# Patient Record
Sex: FEMALE | Race: WHITE | Employment: UNEMPLOYED | URBAN - METROPOLITAN AREA
[De-identification: names, ages, dates, MRNs, and addresses within clinical notes are randomized per-mention and may not be internally consistent; named-entity substitution may affect disease eponyms.]

---

## 2017-01-16 ENCOUNTER — OFFICE VISIT (OUTPATIENT)
Dept: ORTHOPEDIC SURGERY | Age: 57
End: 2017-01-16

## 2017-01-16 VITALS
WEIGHT: 160 LBS | HEART RATE: 60 BPM | SYSTOLIC BLOOD PRESSURE: 148 MMHG | BODY MASS INDEX: 25.11 KG/M2 | DIASTOLIC BLOOD PRESSURE: 78 MMHG | HEIGHT: 67 IN

## 2017-01-16 DIAGNOSIS — M54.16 RADICULOPATHY, LUMBAR REGION: ICD-10-CM

## 2017-01-16 DIAGNOSIS — M54.5 LOW BACK PAIN, UNSPECIFIED BACK PAIN LATERALITY, UNSPECIFIED CHRONICITY, WITH SCIATICA PRESENCE UNSPECIFIED: Primary | ICD-10-CM

## 2017-01-16 NOTE — PROGRESS NOTES
[facility]  [address]  [address], [place]   Phone: [phone]  Fax: [phone]        PROGRESS NOTE      HISTORY OF PRESENT ILLNESS:  The patient is a 60 y.o. female and was seen today for follow up of low back pain extending into the BLE to the feet with paraesthesias of BLE since early October 2016. She states her legs will often give out. She is a poor historian. I last saw patient in 8/2013 with widespread pain complaints and additional low back pain extending into the LLE. Her pain at that time was felt to be in a nonanatomic distribution. She was noted to have minimal degenerative changes on MRI and a negative lower extremity EMG. At that time, she was referred to rheumatologist and was to f/u with me but failed to do so. Note from Bennett Perez NP dated 6/27/16 indicating patient was seen with after falling down steps few days prior to the visit with multiple sore spots. ER note dated 10/23/16 from Dr. Veronica Andujar indicating patient presented with low back pain after following down three steps of stairs after slipping on a leaf one week prior to visit. Patient reported she was given Prednisone at the ER recently. She continued with Neurontin 600 mg TID and Ultram. Of note, she has hx of RHEUMATOID ARTHRITIS, seizures, and CVA with left-sided hemiparesis. She denies being followed by a rheumatologist regularly. Patient has previously seen Dr. Aguilar Seals, neurologist. Patient reports fever, weight loss, and skin changes. Patient also reports changes in bowel or bladder habits. Lumbar spine MRI dated 8/13/13 per report revealed no canal or foraminal narrowing. Small L5-S1 diffuse disc bulge possible right exiting nerve root contact. Lumbar spine XR dated 10/30/16 per report revealed no osseous abnormality identified. Normal alignment. The patient is RH dominant. At her last clinical appointment,  She d/c'd her HEP due to pain.  She continued to have widespread pain complaints. The patient returns today with pain location and distribution remain unchanged. She continues to rate pain 7/10. BLE EMG dated 11/29/16 was per report within normal limits. Lumbar spine MRI dated 12/20/16 reviewed. Per report, relatively minimal degenerative disc disease L4-S1. Moderate multilevel facet arthropathy. No high-grade canal or foraminal stenosis at any level. No focal impingement to explain patient's lower extremity radiculopathy.  reviewed. Past Medical History   Diagnosis Date    Heart murmur     Seizure ([address]     Stroke [facility])         Social History     Social History    Marital status:      Spouse name: N/A    Number of children: N/A    Years of education: N/A     Occupational History    Not on file. Social History Main Topics    Smoking status: Current Every Day Smoker    Smokeless tobacco: Never Used    Alcohol use Yes    Drug use: Not on file    Sexual activity: Not on file     Other Topics Concern    Not on file     Social History Narrative       Current Outpatient Prescriptions   Medication Sig Dispense Refill    lisinopril (PRINIVIL, ZESTRIL) 5 mg tablet Take  by mouth daily.  atenolol (TENORMIN) 100 mg tablet Take 100 mg by mouth daily.  conjugated estrogens (PREMARIN) 1.25 mg tablet Take  by mouth.  traMADol (ULTRAM) 50 mg tablet Take 50 mg by mouth every six (6) hours as needed for Pain.  loratadine 10 mg cap Take  by mouth.  lidocaine (LIDODERM) 5 % by TransDERmal route every twenty-four (24) hours. Apply patch to the affected area for 12 hours a day and remove for 12 hours a day.  lactulose (KRISTALOSE) 10 gram packet Take 10 g by mouth three (3) times daily.  gabapentin (NEURONTIN) 600 mg tablet Take  by mouth three (3) times daily.  albuterol (PROAIR HFA) 90 mcg/actuation inhaler Take  by inhalation.  ALPRAZolam (XANAX) 1 mg tablet Take  by mouth.       cyclobenzaprine (FLEXERIL) 10 mg tablet Take  by mouth three (3) times daily as needed for Muscle Spasm(s).  cholecalciferol (VITAMIN D3) 1,000 unit cap Take  by mouth daily.  magnesium 250 mg tab Take  by mouth.  potassium 99 mg tablet Take 99 mg by mouth daily.  BIOTIN, BULK, by Does Not Apply route.  glucosamine-chondroitin (ARTHX) 500-400 mg cap Take 1 Cap by mouth daily.  predniSONE (DELTASONE) 20 mg tablet Take  by mouth daily (with breakfast).  dicyclomine (BENTYL) 20 mg tablet Take 20 mg by mouth every six (6) hours. Allergies   Allergen Reactions    Codeine Unknown (comments)    Compazine [Prochlorperazine] Unknown (comments)    Demerol [Meperidine] Unknown (comments)        Ambulates with a single point cane. PHYSICAL EXAMINATION    Visit Vitals    /78    Pulse 60    Ht 5' 7\" (1.702 m)    Wt 160 lb (72.6 kg)    BMI 25.06 kg/m2       CONSTITUTIONAL: NAD, A&O x 3  SENSATION: Decreased sensation to light touch circumferentially at LLE. Sensation to light touch otherwise intact. NEURO: Connor's is negative bilaterally. RANGE OF MOTION: The patient has full passive range of motion in all four extremities. MOTOR:  Straight Leg Raise: Negative, bilateral               Hip Flex Knee Ext Knee Flex Ankle DF GTE Ankle PF Tone   Right +4/5 +4/5 +4/5 +4/5 +4/5 +4/5 +4/5   Left +4/5 +4/5 +4/5 +4/5 +4/5 +4/5 +4/5   Breakaway strength, LLE. Patient appears to have at least 4/5 strength for all major muscle groups of the LLE. ASSESSMENT   Michelle Talamantes was seen today for back pain. Diagnoses and all orders for this visit:    Low back pain, unspecified back pain laterality, unspecified chronicity, with sciatica presence unspecified    Radiculopathy, lumbar region          IMPRESSION AND PLAN:  I cannot explain her symptoms based on spinal pathology. Rheumatoid arthritis is likely the source of her widespread pain complaints.  She should continue receiving Neurontin from Dr. Augustine Montano and Ultram from her PCP. I will see the patient back on an as-needed basis. Written by Yulia Enamorado, as dictated by Cassius Cowden, MD  I examined the patient, reviewed and agree with the note.

## 2017-01-16 NOTE — MR AVS SNAPSHOT
Visit Information Date & Time Provider Department Dept. Phone Encounter #  
 1/16/2017  2:55 PM Pooja Gibson MD [facility] [place] [phone] [id number] Upcoming Health Maintenance Date Due Hepatitis C Screening 1960 Pneumococcal 19-64 Medium Risk (1 of 1 - PPSV23) 5/14/1977 DTaP/Tdap/Td series (1 - Tdap) 5/14/1979 PAP AKA CERVICAL CYTOLOGY 5/14/1979 BREAST CANCER SCRN MAMMOGRAM 5/14/2008 FOBT Q 1 YEAR AGE 50-75 5/14/2008 INFLUENZA AGE 9 TO ADULT 8/1/2016 Allergies as of 1/16/2017  Review Complete On: 1/16/2017 By: Pooja Gibson MD  
  
 Severity Noted Reaction Type Reactions Codeine  11/08/2016    Unknown (comments) Compazine [Prochlorperazine]  11/08/2016    Unknown (comments) Demerol [Meperidine]  11/08/2016    Unknown (comments) Current Immunizations  Never Reviewed No immunizations on file. Not reviewed this visit Vitals BP Pulse Height(growth percentile) Weight(growth percentile) BMI Smoking Status 148/78 60 5' 7\" (1.702 m) 160 lb (72.6 kg) 25.06 kg/m2 Current Every Day Smoker Vitals History BMI and BSA Data Body Mass Index Body Surface Area 25.06 kg/m 2 1.85 m 2 Your Updated Medication List  
  
   
This list is accurate as of: 1/16/17  3:28 PM.  Always use your most recent med list.  
  
  
  
  
 ALPRAZolam 1 mg tablet Commonly known as:  Cristina Fell Take  by mouth. atenolol 100 mg tablet Commonly known as:  TENORMIN Take 100 mg by mouth daily. BIOTIN (BULK)  
by Does Not Apply route. cyclobenzaprine 10 mg tablet Commonly known as:  FLEXERIL Take  by mouth three (3) times daily as needed for Muscle Spasm(s). dicyclomine 20 mg tablet Commonly known as:  BENTYL Take 20 mg by mouth every six (6) hours. gabapentin 600 mg tablet Commonly known as:  NEURONTIN Take  by mouth three (3) times daily. glucosamine-chondroitin 500-400 mg Cap Commonly known as:  [facility] Take 1 Cap by mouth daily. lactulose 10 gram packet Commonly known as:  Tiffanie Esters Take 10 g by mouth three (3) times daily. lidocaine 5 % Commonly known as:  LIDODERM  
by TransDERmal route every twenty-four (24) hours. Apply patch to the affected area for 12 hours a day and remove for 12 hours a day. lisinopril 5 mg tablet Commonly known as:  Thersia Kubas Take  by mouth daily. loratadine 10 mg Cap Take  by mouth.  
  
 magnesium 250 mg Tab Take  by mouth.  
  
 potassium 99 mg tablet Take 99 mg by mouth daily. predniSONE 20 mg tablet Commonly known as:  Ambar Mary Take  by mouth daily (with breakfast). PREMARIN 1.25 mg tablet Generic drug:  conjugated estrogens Take  by mouth. PROAIR HFA 90 mcg/actuation inhaler Generic drug:  albuterol Take  by inhalation. traMADol 50 mg tablet Commonly known as:  ULTRAM  
Take 50 mg by mouth every six (6) hours as needed for Pain. VITAMIN D3 1,000 unit Cap Generic drug:  cholecalciferol Take  by mouth daily. Introducing [facility]! [employer] introduces [employer] patient portal. Now you can access parts of your medical record, email your doctor's office, and request medication refills online. 1. In your internet browser, go to [email or web address] 2. Click on the First Time User? Click Here link in the Sign In box. You will see the New Member Sign Up page. 3. Enter your [employer] Access Code exactly as it appears below. You will not need to use this code after youve completed the sign-up process. If you do not sign up before the expiration date, you must request a new code. · [employer] Access Code: [email or web address] Expires: 2/6/2017  8:11 AM 
 
4.  Enter the last four digits of your Social Security Number (xxxx) and Date of Birth (mm/dd/yyyy) as indicated and click Submit. You will be taken to the next sign-up page. 5. Create a [employer] ID. This will be your [employer] login ID and cannot be changed, so think of one that is secure and easy to remember. 6. Create a [employer] password. You can change your password at any time. 7. Enter your Password Reset Question and Answer. This can be used at a later time if you forget your password. 8. Enter your e-mail address. You will receive e-mail notification when new information is available in [address] 9. Click Sign Up. You can now view and download portions of your medical record. 10. Click the Download Summary menu link to download a portable copy of your medical information. If you have questions, please visit the Frequently Asked Questions section of the [employer] website. Remember, [employer] is NOT to be used for urgent needs. For medical emergencies, dial 911. Now available from your iPhone and Android! Please provide this summary of care documentation to your next provider. Your primary care clinician is listed as Geo Tiwari. If you have any questions after today's visit, please call [phone].

## 2017-01-26 DIAGNOSIS — M54.50 NONSPECIFIC PAIN LUMBAR REGION: ICD-10-CM

## 2017-01-26 DIAGNOSIS — M54.16 LUMBAR NEURITIS: ICD-10-CM

## 2022-04-04 ENCOUNTER — TRANSCRIBE ORDER (OUTPATIENT)
Dept: SCHEDULING | Age: 62
End: 2022-04-04

## 2022-04-04 DIAGNOSIS — R10.11 ABDOMINAL PAIN, RIGHT UPPER QUADRANT: Primary | ICD-10-CM

## 2022-04-04 DIAGNOSIS — Z12.31 OTHER SCREENING MAMMOGRAM: Primary | ICD-10-CM

## 2022-04-21 ENCOUNTER — TRANSCRIBE ORDER (OUTPATIENT)
Dept: SCHEDULING | Age: 62
End: 2022-04-21

## 2022-04-21 ENCOUNTER — HOSPITAL ENCOUNTER (OUTPATIENT)
Dept: MAMMOGRAPHY | Age: 62
Discharge: HOME | End: 2022-04-21
Payer: MEDICARE

## 2022-04-21 ENCOUNTER — HOSPITAL ENCOUNTER (OUTPATIENT)
Dept: ULTRASOUND IMAGING | Age: 62
Discharge: HOME | End: 2022-04-21
Payer: MEDICARE

## 2022-04-21 DIAGNOSIS — R10.11 ABDOMINAL PAIN, RUQ: Primary | ICD-10-CM

## 2022-04-21 DIAGNOSIS — R10.11 ABDOMINAL PAIN, RIGHT UPPER QUADRANT: ICD-10-CM

## 2022-04-21 DIAGNOSIS — Z12.31 OTHER SCREENING MAMMOGRAM: ICD-10-CM

## 2022-04-21 PROCEDURE — 76705 ECHO EXAM OF ABDOMEN: CPT

## 2022-04-21 PROCEDURE — 77063 BREAST TOMOSYNTHESIS BI: CPT

## 2022-04-27 ENCOUNTER — HOSPITAL ENCOUNTER (OUTPATIENT)
Dept: NUCLEAR MEDICINE | Age: 62
Discharge: HOME | End: 2022-04-27
Payer: MEDICARE

## 2022-04-27 VITALS — WEIGHT: 190 LBS | BODY MASS INDEX: 29.82 KG/M2 | HEIGHT: 67 IN

## 2022-04-27 DIAGNOSIS — R10.11 ABDOMINAL PAIN, RUQ: ICD-10-CM

## 2022-04-27 PROCEDURE — 78227 HEPATOBIL SYST IMAGE W/DRUG: CPT

## 2022-04-27 PROCEDURE — 74011250636 HC RX REV CODE- 250/636: Performed by: FAMILY MEDICINE

## 2022-04-27 RX ORDER — KIT FOR THE PREPARATION OF TECHNETIUM TC 99M MEBROFENIN 45 MG/10ML
5.6 INJECTION, POWDER, LYOPHILIZED, FOR SOLUTION INTRAVENOUS
Status: COMPLETED | OUTPATIENT
Start: 2022-04-27 | End: 2022-04-27

## 2022-04-27 RX ADMIN — KIT FOR THE PREPARATION OF TECHNETIUM TC 99M MEBROFENIN 5.6 MILLICURIE: 45 INJECTION, POWDER, LYOPHILIZED, FOR SOLUTION INTRAVENOUS at 09:00

## 2022-04-27 RX ADMIN — SINCALIDE 1.7 MCG: 5 INJECTION, POWDER, LYOPHILIZED, FOR SOLUTION INTRAVENOUS at 10:26
